# Patient Record
Sex: FEMALE | Race: OTHER | Employment: UNEMPLOYED | URBAN - METROPOLITAN AREA
[De-identification: names, ages, dates, MRNs, and addresses within clinical notes are randomized per-mention and may not be internally consistent; named-entity substitution may affect disease eponyms.]

---

## 2019-01-14 ENCOUNTER — OFFICE VISIT (OUTPATIENT)
Dept: FAMILY MEDICINE CLINIC | Facility: CLINIC | Age: 19
End: 2019-01-14
Payer: MEDICAID

## 2019-01-14 VITALS
SYSTOLIC BLOOD PRESSURE: 114 MMHG | DIASTOLIC BLOOD PRESSURE: 73 MMHG | HEIGHT: 61.6 IN | BODY MASS INDEX: 27.21 KG/M2 | HEART RATE: 85 BPM | RESPIRATION RATE: 20 BRPM | TEMPERATURE: 98 F | WEIGHT: 146 LBS

## 2019-01-14 DIAGNOSIS — R10.13 DYSPEPSIA: ICD-10-CM

## 2019-01-14 DIAGNOSIS — L50.9 URTICARIA: ICD-10-CM

## 2019-01-14 PROCEDURE — 99213 OFFICE O/P EST LOW 20 MIN: CPT | Performed by: FAMILY MEDICINE

## 2019-01-14 PROCEDURE — 99212 OFFICE O/P EST SF 10 MIN: CPT | Performed by: FAMILY MEDICINE

## 2019-01-14 NOTE — PROGRESS NOTES
HPI:    Patient ID: Elizabeth Dent is a 22year old female. Pt presents with hives on the abdomen, chest for the last week. No new topical agents or ingestions. Pt has tried otc remedies with benadryl and hives have resolved. No fevers or acute illness.

## 2019-03-05 PROCEDURE — 82784 ASSAY IGA/IGD/IGG/IGM EACH: CPT | Performed by: INTERNAL MEDICINE

## 2019-03-09 ENCOUNTER — LAB ENCOUNTER (OUTPATIENT)
Dept: LAB | Age: 19
End: 2019-03-09
Attending: INTERNAL MEDICINE
Payer: MEDICAID

## 2019-03-09 DIAGNOSIS — R19.7 DIARRHEA, UNSPECIFIED TYPE: ICD-10-CM

## 2019-03-09 LAB
CRYPTOSP AG STL QL IA: NEGATIVE
G LAMBLIA AG STL QL IA: NEGATIVE

## 2019-03-09 PROCEDURE — 87177 OVA AND PARASITES SMEARS: CPT

## 2019-03-09 PROCEDURE — 87272 CRYPTOSPORIDIUM AG IF: CPT

## 2019-03-09 PROCEDURE — 83993 ASSAY FOR CALPROTECTIN FECAL: CPT

## 2019-03-09 PROCEDURE — 87329 GIARDIA AG IA: CPT

## 2019-03-09 PROCEDURE — 87209 SMEAR COMPLEX STAIN: CPT

## 2019-03-12 LAB — CALPROTECTIN, FECAL: <16 UG/G

## 2019-03-18 LAB — OVA AND PARASITE, TRICHROME STAIN: NEGATIVE

## 2020-01-23 ENCOUNTER — OFFICE VISIT (OUTPATIENT)
Dept: FAMILY MEDICINE CLINIC | Facility: CLINIC | Age: 20
End: 2020-01-23
Payer: MEDICAID

## 2020-01-23 VITALS
HEIGHT: 61 IN | RESPIRATION RATE: 20 BRPM | WEIGHT: 148 LBS | SYSTOLIC BLOOD PRESSURE: 117 MMHG | DIASTOLIC BLOOD PRESSURE: 79 MMHG | TEMPERATURE: 98 F | BODY MASS INDEX: 27.94 KG/M2 | HEART RATE: 106 BPM

## 2020-01-23 DIAGNOSIS — M25.562 ACUTE PAIN OF LEFT KNEE: Primary | ICD-10-CM

## 2020-01-23 PROCEDURE — 99213 OFFICE O/P EST LOW 20 MIN: CPT | Performed by: FAMILY MEDICINE

## 2020-01-23 RX ORDER — BUPROPION HYDROCHLORIDE 75 MG/1
TABLET ORAL
COMMUNITY
Start: 2019-11-01

## 2020-01-23 RX ORDER — QUETIAPINE 50 MG/1
TABLET, FILM COATED ORAL
COMMUNITY
Start: 2019-07-05

## 2020-01-23 NOTE — PROGRESS NOTES
HPI:    Patient ID: Consuelo Samuel is a 20year old female. Pt presents with left knee pain over the last week. Pt has had some issues with his knee at times. Pt has sensitivity of the knee cap area. No swelling. No injury or trauma.  Pt feels pressure o

## 2020-01-25 ENCOUNTER — HOSPITAL ENCOUNTER (OUTPATIENT)
Dept: GENERAL RADIOLOGY | Age: 20
Discharge: HOME | End: 2020-01-25
Attending: FAMILY MEDICINE
Payer: MEDICAID

## 2020-01-25 DIAGNOSIS — M25.562 ACUTE PAIN OF LEFT KNEE: ICD-10-CM

## 2020-01-25 PROCEDURE — 73562 X-RAY EXAM OF KNEE 3: CPT | Performed by: FAMILY MEDICINE

## 2020-03-24 ENCOUNTER — OFFICE VISIT (OUTPATIENT)
Dept: FAMILY MEDICINE CLINIC | Facility: CLINIC | Age: 20
End: 2020-03-24
Payer: MEDICAID

## 2020-03-24 VITALS
WEIGHT: 142 LBS | HEIGHT: 61 IN | DIASTOLIC BLOOD PRESSURE: 81 MMHG | BODY MASS INDEX: 26.81 KG/M2 | RESPIRATION RATE: 20 BRPM | TEMPERATURE: 98 F | SYSTOLIC BLOOD PRESSURE: 114 MMHG | HEART RATE: 94 BPM

## 2020-03-24 DIAGNOSIS — M25.562 ACUTE PAIN OF LEFT KNEE: ICD-10-CM

## 2020-03-24 PROCEDURE — 99213 OFFICE O/P EST LOW 20 MIN: CPT | Performed by: FAMILY MEDICINE

## 2020-03-24 NOTE — PROGRESS NOTES
HPI:    Patient ID: Leigh Ann Camilo is a 23year old female. Pt presents for follow up for her left knee pain. Pt states pain occurs when she is carrying weight on her back. Pt states no pain. No feeling of giving out. Pt denies any sig swelling.    Pt has

## 2020-06-16 ENCOUNTER — OFFICE VISIT (OUTPATIENT)
Dept: ORTHOPEDICS CLINIC | Facility: CLINIC | Age: 20
End: 2020-06-16
Payer: MEDICAID

## 2020-06-16 DIAGNOSIS — M76.892 TENDINITIS OF LEFT QUADRICEPS TENDON: Primary | ICD-10-CM

## 2020-06-16 PROCEDURE — 99243 OFF/OP CNSLTJ NEW/EST LOW 30: CPT | Performed by: ORTHOPAEDIC SURGERY

## 2020-06-16 NOTE — H&P
NURSING INTAKE COMMENTS: Patient presents with:  Knee Pain: left- pt states pain started november 2019. pt denies any injury. pt had XR in 01/2020      HPI: This 23year old female presents today with complaints of left knee pain for about 5 months.   Insid neck: Atraumatic, normocephalic, PERRLA  Respiratory: Regular, unlabored breathing  GI: Abdomen soft and nondistended  Vascular: 2+ and symmetric pulses  Skin: intact and benign  Neurologic: Bilateral motor strength symmetric and intact grossly, sensation

## 2020-06-25 ENCOUNTER — OFFICE VISIT (OUTPATIENT)
Dept: PHYSICAL THERAPY | Age: 20
End: 2020-06-25
Attending: ORTHOPAEDIC SURGERY
Payer: MEDICAID

## 2020-06-25 PROCEDURE — 97161 PT EVAL LOW COMPLEX 20 MIN: CPT

## 2020-06-25 PROCEDURE — 97110 THERAPEUTIC EXERCISES: CPT

## 2020-06-25 NOTE — PROGRESS NOTES
LOWER EXTREMITY EVALUATION:   Referring Physician: Dr. Castro Diaz  Date of Onset: Jan 2020 Date of Service: 6/25/2020   Diagnosis: Tendinitis of left quadriceps tendon (O33.568)  PATIENT SUMMARY:   Lydia Ruggiero is a 23year old y/o female who presents to the pain. Skilled Physical Therapy is medically necessary to address the above impairments and reach functional goals.      In agreement with FOTO score and clinical rationale, this evaluation involved Low Complexity decision making due to 1-2 personal factors/ include: Gait training, Manual Therapy, Neuromuscular Re-education, Therapeutic Activities, Therapeutic Exercise and Home Exercise Program instruction    Education or treatment limitation: None  Rehab Potential: excellent    FOTO: not given due to Covid-19

## 2020-06-30 ENCOUNTER — OFFICE VISIT (OUTPATIENT)
Dept: PHYSICAL THERAPY | Age: 20
End: 2020-06-30
Attending: ORTHOPAEDIC SURGERY
Payer: MEDICAID

## 2020-06-30 PROCEDURE — 97110 THERAPEUTIC EXERCISES: CPT

## 2020-07-02 ENCOUNTER — OFFICE VISIT (OUTPATIENT)
Dept: PHYSICAL THERAPY | Age: 20
End: 2020-07-02
Attending: ORTHOPAEDIC SURGERY
Payer: MEDICAID

## 2020-07-02 PROCEDURE — 97110 THERAPEUTIC EXERCISES: CPT

## 2020-07-02 NOTE — PROGRESS NOTES
Dx:    Tendinitis of left quadriceps tendon (I49.066)      Insurance (Authorized # of Visits):  6 800 [employer] exp 12/22/20   POC visits: 6   Authorizing Physician: Dr. Castro Walker MD visit: none scheduled  Fall Risk: standard         Precautions:          Subjec

## 2020-07-07 ENCOUNTER — OFFICE VISIT (OUTPATIENT)
Dept: PHYSICAL THERAPY | Age: 20
End: 2020-07-07
Attending: ORTHOPAEDIC SURGERY
Payer: MEDICAID

## 2020-07-07 PROCEDURE — 97110 THERAPEUTIC EXERCISES: CPT

## 2020-07-07 NOTE — PROGRESS NOTES
Dx:    Tendinitis of left quadriceps tendon (A85.433)      Insurance (Authorized # of Visits):  6 800 [employer] exp 12/22/20   POC visits: 6   Authorizing Physician: Dr. Matthew Walker MD visit: none scheduled  Fall Risk: standard         Precautions:          Subjec yellow TB 15x B  Shuttle 6B 30x DL  Shuttle 5B 30x SL                              HEP:6/30/20- increase number of sets of HEP provided at eval    Charges: TEx3     Total Timed Treatment: 40 min  Total Treatment Time: 40 min

## 2020-07-09 ENCOUNTER — OFFICE VISIT (OUTPATIENT)
Dept: PHYSICAL THERAPY | Age: 20
End: 2020-07-09
Attending: ORTHOPAEDIC SURGERY
Payer: MEDICAID

## 2020-07-09 PROCEDURE — 97110 THERAPEUTIC EXERCISES: CPT

## 2020-07-09 NOTE — PROGRESS NOTES
Dx:    Tendinitis of left quadriceps tendon (D45.009)      Insurance (Authorized # of Visits):  6 800 [employer] exp 12/22/20   POC visits: 6   Authorizing Physician: Dr. Vladimir Walker MD visit: none scheduled  Fall Risk: standard         Precautions:          Subjec green TB 15x2  Supine hip abd yellow TB 15x B  Shuttle 6B 30x DL  Shuttle 5B 30x SL     There ex:  scifit L 4 x 6 min  Re-assessment  sidelying hip abd 15x2 B  Supine quad set w/towel roll 5 secx15  SLR 5 secx15 B  LAQ 5 secx15 B  Standing hip abd/ext flex

## 2020-07-14 ENCOUNTER — OFFICE VISIT (OUTPATIENT)
Dept: PHYSICAL THERAPY | Age: 20
End: 2020-07-14
Attending: ORTHOPAEDIC SURGERY
Payer: MEDICAID

## 2020-07-14 PROCEDURE — 97110 THERAPEUTIC EXERCISES: CPT

## 2020-07-14 NOTE — PROGRESS NOTES
Dx:    Tendinitis of left quadriceps tendon (Q67.712)      Insurance (Authorized # of Visits):  6 800 [email or web address] exp 12/22/20   POC visits: 6   Authorizing Physician: Dr. Renetta Rousseau  Next MD visit: none scheduled  Fall Risk: standard         Precautions:          Subjec min  Shuttle 6B 30x DL  Shuttle 5B 30x SL ea  Standing hip abd/ext yellow TB 15xea B  Standing hip drive 54GW  Bridge w/hip abd green TB 15x2  hooklying TA bracing 5 secx15  hooklying TA w/ball squeeze 5 secx15  SLR w/TA 15x2 B There ex:  scifit L3 x 6 min

## 2020-07-16 ENCOUNTER — APPOINTMENT (OUTPATIENT)
Dept: PHYSICAL THERAPY | Age: 20
End: 2020-07-16
Attending: ORTHOPAEDIC SURGERY
Payer: MEDICAID

## 2020-07-21 ENCOUNTER — APPOINTMENT (OUTPATIENT)
Dept: PHYSICAL THERAPY | Age: 20
End: 2020-07-21
Attending: ORTHOPAEDIC SURGERY
Payer: MEDICAID

## 2020-07-21 NOTE — PROGRESS NOTES
ProgressSummary  Pt has attended 6 visits in Physical Therapy.      Dx:    Tendinitis of left quadriceps tendon (F61.890)      Insurance (Authorized # of Visits):  6 800 [employer] exp 12/22/20   POC visits: 6   Authorizing Physician: Dr. Suzanna Walker MD visit: non for these services furnished under this plan of treatment and while under my care.     X___________________________________________________ Date____________________    Certification From: 0/74/9805  To:10/19/2020

## 2020-07-23 ENCOUNTER — OFFICE VISIT (OUTPATIENT)
Dept: PHYSICAL THERAPY | Age: 20
End: 2020-07-23
Attending: ORTHOPAEDIC SURGERY
Payer: MEDICAID

## 2020-07-23 PROCEDURE — 97110 THERAPEUTIC EXERCISES: CPT

## 2020-07-23 NOTE — PROGRESS NOTES
Dx:    Tendinitis of left quadriceps tendon (U91.020)      Insurance (Authorized # of Visits):  6 800 [employer] exp 12/22/20   POC visits: 6   Authorizing Physician: Dr. Gibran Brar  Next MD visit: none scheduled  Fall Risk: standard         Precautions:          Subjec knee bend with 1 lbs x 20  SL : hip abduction with 1 lbs x 20  SL : glut med with RTB x 20  Long sit : quad sets with RTB x 20  Sideways steps with RTB x 2 rounds        There ex:    There ex:  scifit L3 x 6 min  Standing hip abd/ext yellow TB 15x2 ea B  St

## 2020-07-28 ENCOUNTER — OFFICE VISIT (OUTPATIENT)
Dept: PHYSICAL THERAPY | Age: 20
End: 2020-07-28
Attending: ORTHOPAEDIC SURGERY
Payer: MEDICAID

## 2020-07-28 PROCEDURE — 97110 THERAPEUTIC EXERCISES: CPT

## 2020-07-28 NOTE — PROGRESS NOTES
Dx:    Tendinitis of left quadriceps tendon (B80.261)      Insurance (Authorized # of Visits):  12 800 [employer] exp 12/22/20   POC visits: 6   Authorizing Physician: Dr. Lexis Walker MD visit: none scheduled  Fall Risk: standard         Precautions:          Anastacio Degree 2   Prone :   Knee flexion stretch with belt assist x 5 with 20 sec hold   Prone : hip extension with 1 lbs x 20  Prone : hip extension with knee bend with 1 lbs x 20  SL : hip abduction with 1 lbs x 20  SL : glut med with RTB x 20  Long sit : quad sets wi

## 2020-07-30 ENCOUNTER — OFFICE VISIT (OUTPATIENT)
Dept: PHYSICAL THERAPY | Age: 20
End: 2020-07-30
Attending: ORTHOPAEDIC SURGERY
Payer: MEDICAID

## 2020-07-30 PROCEDURE — 97110 THERAPEUTIC EXERCISES: CPT

## 2020-07-30 NOTE — PROGRESS NOTES
Dx:    Tendinitis of left quadriceps tendon (L93.767)      Insurance (Authorized # of Visits):  14 800 [employer] exp 1/17/21   POC visits:    Authorizing Physician: Dr. Gibran Walker MD visit: none scheduled  Fall Risk: standard         Precautions:          Subject LAQ         7/14/20 added RTB for all hip ex's         7/28/20 added step up .  Lateral step up and step down on 4 inch step         7/30/20- sidestepping red TB    Charges: TEx3     Total Timed Treatment: 40 min  Total Treatment Time: 40 min

## 2020-08-04 ENCOUNTER — OFFICE VISIT (OUTPATIENT)
Dept: PHYSICAL THERAPY | Age: 20
End: 2020-08-04
Attending: ORTHOPAEDIC SURGERY
Payer: MEDICAID

## 2020-08-04 PROCEDURE — 97110 THERAPEUTIC EXERCISES: CPT

## 2020-08-06 ENCOUNTER — OFFICE VISIT (OUTPATIENT)
Dept: PHYSICAL THERAPY | Age: 20
End: 2020-08-06
Attending: ORTHOPAEDIC SURGERY
Payer: MEDICAID

## 2020-08-06 PROCEDURE — 97110 THERAPEUTIC EXERCISES: CPT

## 2020-08-06 NOTE — PROGRESS NOTES
Dx:    Tendinitis of left quadriceps tendon (Q41.131)      Insurance (Authorized # of Visits):  14 800 [employer] exp 1/17/21   POC visits:    Authorizing Physician: Dr. Cade Walker MD visit: none scheduled  Fall Risk: standard         Precautions:          Subject ex:  scifit bike L4 x 6 min  SB wall squats 15x2  SL squat w/hip abd isometric SB on wall 15xB  SL hip abd/ext/flex on airex 15xea B  Reverse lunge 15x B  SL dead lift 15x B  Sidestepping red tube 15x         There ex:  scifit bike L4 x 6 min  SB wall squa

## 2020-08-11 ENCOUNTER — APPOINTMENT (OUTPATIENT)
Dept: PHYSICAL THERAPY | Age: 20
End: 2020-08-11
Attending: ORTHOPAEDIC SURGERY
Payer: MEDICAID

## 2020-08-11 ENCOUNTER — TELEPHONE (OUTPATIENT)
Dept: PHYSICAL THERAPY | Age: 20
End: 2020-08-11

## 2020-08-13 ENCOUNTER — OFFICE VISIT (OUTPATIENT)
Dept: PHYSICAL THERAPY | Age: 20
End: 2020-08-13
Attending: ORTHOPAEDIC SURGERY
Payer: MEDICAID

## 2020-08-13 PROCEDURE — 97110 THERAPEUTIC EXERCISES: CPT

## 2020-08-13 NOTE — PROGRESS NOTES
Dx:    Tendinitis of left quadriceps tendon (Y93.668)      Insurance (Authorized # of Visits):  14 800 [employer] exp 1/17/21   POC visits:    Authorizing Physician: Dr. Babita Walker MD visit: none scheduled  Fall Risk: standard         Precautions:          Subject hip abduction with 1 lbs x 20  SL : glut med with RTB x 20  Long sit : quad sets with RTB x 20  Sideways steps with RTB x 2 rounds        There ex:  scifit bike L4 x 6 min  SB wall squats 15x2  SL squat w/hip abd isometric SB on wall 15xB  SL hip abd/ext/f

## 2020-08-18 ENCOUNTER — OFFICE VISIT (OUTPATIENT)
Dept: PHYSICAL THERAPY | Age: 20
End: 2020-08-18
Attending: ORTHOPAEDIC SURGERY
Payer: MEDICAID

## 2020-08-18 PROCEDURE — 97110 THERAPEUTIC EXERCISES: CPT

## 2020-08-18 NOTE — PROGRESS NOTES
Dx:    Tendinitis of left quadriceps tendon (E68.316)      Insurance (Authorized # of Visits):  14 800 [employer] exp 1/17/21   POC visits:    Authorizing Physician: Dr. Tadeo Walker MD visit: none scheduled  Fall Risk: standard         Precautions:          Subject

## 2020-08-20 ENCOUNTER — OFFICE VISIT (OUTPATIENT)
Dept: PHYSICAL THERAPY | Age: 20
End: 2020-08-20
Attending: ORTHOPAEDIC SURGERY
Payer: MEDICAID

## 2020-08-20 PROCEDURE — 97110 THERAPEUTIC EXERCISES: CPT

## 2020-08-20 NOTE — PROGRESS NOTES
Dx:    Tendinitis of left quadriceps tendon (Z65.684)      Insurance (Authorized # of Visits):  14 107 [employer] exp 1/17/21   POC visits:    Authorizing Physician: Dr. Herve Reddy  Next MD visit: none scheduled  Fall Risk: standard         Precautions:          Subject with RTB x 2 rounds   Monster walk / retro monster walk with RTBx 2 rounds              HEP:6/30/20- increase number of sets of HEP provided at eval          7/9/20- standing hip abd/ext/flex yellow TB, quad sets, LAQ         7/14/20 added RTB for all hip

## 2020-08-22 ENCOUNTER — NURSE ONLY (OUTPATIENT)
Dept: FAMILY MEDICINE CLINIC | Facility: CLINIC | Age: 20
End: 2020-08-22
Payer: MEDICAID

## 2020-08-22 DIAGNOSIS — Z02.9 ENCOUNTERS FOR ADMINISTRATIVE PURPOSE: Primary | ICD-10-CM

## 2020-08-22 NOTE — PROGRESS NOTES
Patient requesting 2nd meningitis vaccine. Initially only one Menactra shows up under vaccines. After clicking Immunization registration link, 2nd Menactra is present. Printed out immunization record for patient. No questions at this time.

## 2022-02-19 ENCOUNTER — OFFICE VISIT (OUTPATIENT)
Dept: FAMILY MEDICINE CLINIC | Facility: CLINIC | Age: 22
End: 2022-02-19
Payer: MEDICAID

## 2022-02-19 VITALS
TEMPERATURE: 97 F | DIASTOLIC BLOOD PRESSURE: 78 MMHG | HEART RATE: 100 BPM | SYSTOLIC BLOOD PRESSURE: 115 MMHG | RESPIRATION RATE: 20 BRPM | WEIGHT: 143 LBS | HEIGHT: 61 IN | BODY MASS INDEX: 27 KG/M2

## 2022-02-19 DIAGNOSIS — L74.512 HYPERHIDROSIS OF PALMS AND SOLES: ICD-10-CM

## 2022-02-19 DIAGNOSIS — L74.513 HYPERHIDROSIS OF PALMS AND SOLES: ICD-10-CM

## 2022-02-19 PROCEDURE — 3008F BODY MASS INDEX DOCD: CPT | Performed by: FAMILY MEDICINE

## 2022-02-19 PROCEDURE — 3078F DIAST BP <80 MM HG: CPT | Performed by: FAMILY MEDICINE

## 2022-02-19 PROCEDURE — 99213 OFFICE O/P EST LOW 20 MIN: CPT | Performed by: FAMILY MEDICINE

## 2022-02-19 PROCEDURE — 3074F SYST BP LT 130 MM HG: CPT | Performed by: FAMILY MEDICINE

## 2022-02-28 ENCOUNTER — OFFICE VISIT (OUTPATIENT)
Dept: DERMATOLOGY CLINIC | Facility: CLINIC | Age: 22
End: 2022-02-28
Payer: MEDICAID

## 2022-02-28 DIAGNOSIS — R61 HYPERHIDROSIS: Primary | ICD-10-CM

## 2022-02-28 PROCEDURE — 99203 OFFICE O/P NEW LOW 30 MIN: CPT | Performed by: PHYSICIAN ASSISTANT

## 2022-11-09 NOTE — PROGRESS NOTES
Dx:    Tendinitis of left quadriceps tendon (I43.689)      Insurance (Authorized # of Visits):  6 800 [employer] exp 12/22/20   POC visits: 6   Authorizing Physician: Dr. Lexis Walker MD visit: none scheduled  Fall Risk: standard         Precautions:          Subjec 20

## 2024-01-29 ENCOUNTER — APPOINTMENT (OUTPATIENT)
Dept: URBAN - METROPOLITAN AREA CLINIC 244 | Age: 24
Setting detail: DERMATOLOGY
End: 2024-01-30

## 2024-01-29 DIAGNOSIS — L74.51 PRIMARY FOCAL HYPERHIDROSIS: ICD-10-CM

## 2024-01-29 PROBLEM — L74.512 PRIMARY FOCAL HYPERHIDROSIS, PALMS: Status: ACTIVE | Noted: 2024-01-29

## 2024-01-29 PROCEDURE — OTHER OTC TREATMENT REGIMEN: OTHER

## 2024-01-29 PROCEDURE — OTHER ADDITIONAL NOTES: OTHER

## 2024-01-29 PROCEDURE — OTHER PRESCRIPTION: OTHER

## 2024-01-29 PROCEDURE — OTHER COUNSELING: OTHER

## 2024-01-29 PROCEDURE — 99204 OFFICE O/P NEW MOD 45 MIN: CPT

## 2024-01-29 RX ORDER — GLYCOPYRROLATE 1 MG/1
TABLET ORAL
Qty: 60 | Refills: 0 | Status: ERX | COMMUNITY
Start: 2024-01-29

## 2024-01-29 ASSESSMENT — LOCATION SIMPLE DESCRIPTION DERM
LOCATION SIMPLE: LEFT HAND
LOCATION SIMPLE: RIGHT HAND

## 2024-01-29 ASSESSMENT — LOCATION ZONE DERM: LOCATION ZONE: HAND

## 2024-01-29 ASSESSMENT — LOCATION DETAILED DESCRIPTION DERM
LOCATION DETAILED: LEFT RADIAL PALM
LOCATION DETAILED: RIGHT RADIAL PALM

## 2024-01-29 NOTE — PROCEDURE: ADDITIONAL NOTES
Render Risk Assessment In Note?: no
Detail Level: Simple
Additional Notes: Discussed taking oral glycopyrrolate and side effects.

## 2024-01-29 NOTE — PROCEDURE: OTC TREATMENT REGIMEN
Patient Specific Otc Recommendations (Will Not Stick From Patient To Patient): Iontonicphoresis machine
Detail Level: Zone

## 2024-01-30 ENCOUNTER — MED REC SCAN ONLY (OUTPATIENT)
Dept: FAMILY MEDICINE CLINIC | Facility: CLINIC | Age: 24
End: 2024-01-30

## 2024-02-19 ENCOUNTER — APPOINTMENT (OUTPATIENT)
Dept: URBAN - METROPOLITAN AREA CLINIC 244 | Age: 24
Setting detail: DERMATOLOGY
End: 2024-02-20

## 2024-02-19 DIAGNOSIS — L74.51 PRIMARY FOCAL HYPERHIDROSIS: ICD-10-CM

## 2024-02-19 PROBLEM — L74.512 PRIMARY FOCAL HYPERHIDROSIS, PALMS: Status: ACTIVE | Noted: 2024-02-19

## 2024-02-19 PROCEDURE — OTHER COUNSELING: OTHER

## 2024-02-19 PROCEDURE — OTHER PRESCRIPTION: OTHER

## 2024-02-19 PROCEDURE — OTHER OTC TREATMENT REGIMEN: OTHER

## 2024-02-19 PROCEDURE — 99214 OFFICE O/P EST MOD 30 MIN: CPT

## 2024-02-19 PROCEDURE — OTHER ADDITIONAL NOTES: OTHER

## 2024-02-19 RX ORDER — GLYCOPYRROLATE 1 MG/1
TABLET ORAL
Qty: 120 | Refills: 0 | Status: ERX

## 2024-02-19 ASSESSMENT — LOCATION SIMPLE DESCRIPTION DERM
LOCATION SIMPLE: RIGHT HAND
LOCATION SIMPLE: LEFT HAND

## 2024-02-19 ASSESSMENT — LOCATION ZONE DERM: LOCATION ZONE: HAND

## 2024-02-19 ASSESSMENT — LOCATION DETAILED DESCRIPTION DERM
LOCATION DETAILED: RIGHT RADIAL PALM
LOCATION DETAILED: LEFT RADIAL PALM

## 2024-02-19 NOTE — PROCEDURE: ADDITIONAL NOTES
Render Risk Assessment In Note?: no
Detail Level: Simple
Additional Notes: Discussed taking oral glycopyrrolate and side effects.\\n\\nPt would like to increase dose

## 2024-02-20 ENCOUNTER — MED REC SCAN ONLY (OUTPATIENT)
Dept: FAMILY MEDICINE CLINIC | Facility: CLINIC | Age: 24
End: 2024-02-20

## 2024-03-25 ENCOUNTER — APPOINTMENT (OUTPATIENT)
Dept: URBAN - METROPOLITAN AREA CLINIC 244 | Age: 24
Setting detail: DERMATOLOGY
End: 2024-03-25

## 2024-03-25 DIAGNOSIS — L74.51 PRIMARY FOCAL HYPERHIDROSIS: ICD-10-CM

## 2024-03-25 PROBLEM — L74.512 PRIMARY FOCAL HYPERHIDROSIS, PALMS: Status: ACTIVE | Noted: 2024-03-25

## 2024-03-25 PROCEDURE — 99214 OFFICE O/P EST MOD 30 MIN: CPT

## 2024-03-25 PROCEDURE — OTHER COUNSELING: OTHER

## 2024-03-25 PROCEDURE — OTHER PRESCRIPTION MEDICATION MANAGEMENT: OTHER

## 2024-03-25 PROCEDURE — OTHER OTC TREATMENT REGIMEN: OTHER

## 2024-03-25 PROCEDURE — OTHER PRESCRIPTION: OTHER

## 2024-03-25 RX ORDER — GLYCOPYRROLATE 1 MG/1
TABLET ORAL
Qty: 120 | Refills: 3 | Status: ERX

## 2024-03-25 RX ORDER — ALUMINUM CHLORIDE 20 %
SOLUTION, NON-ORAL TOPICAL QHS
Qty: 60 | Refills: 11 | Status: ERX | COMMUNITY
Start: 2024-03-25

## 2024-03-25 ASSESSMENT — LOCATION SIMPLE DESCRIPTION DERM
LOCATION SIMPLE: LEFT HAND
LOCATION SIMPLE: RIGHT HAND

## 2024-03-25 ASSESSMENT — LOCATION ZONE DERM: LOCATION ZONE: HAND

## 2024-03-25 ASSESSMENT — LOCATION DETAILED DESCRIPTION DERM
LOCATION DETAILED: RIGHT RADIAL PALM
LOCATION DETAILED: LEFT RADIAL PALM

## 2024-03-25 NOTE — PROCEDURE: OTC TREATMENT REGIMEN
Patient Specific Otc Recommendations (Will Not Stick From Patient To Patient): -Iontonicphoresis machine\\n-Carpe\\n-antiperspirant
Detail Level: Zone

## 2024-03-25 NOTE — PROCEDURE: PRESCRIPTION MEDICATION MANAGEMENT
Detail Level: Zone
Render In Strict Bullet Format?: No
Initiate Treatment: Drysol
Modify Regimen: Glycopyrrolate 4 tabs a day prn

## 2024-03-26 ENCOUNTER — MED REC SCAN ONLY (OUTPATIENT)
Dept: FAMILY MEDICINE CLINIC | Facility: CLINIC | Age: 24
End: 2024-03-26

## 2024-05-13 ENCOUNTER — MED REC SCAN ONLY (OUTPATIENT)
Dept: FAMILY MEDICINE CLINIC | Facility: CLINIC | Age: 24
End: 2024-05-13

## 2024-05-13 ENCOUNTER — APPOINTMENT (OUTPATIENT)
Dept: URBAN - METROPOLITAN AREA CLINIC 244 | Age: 24
Setting detail: DERMATOLOGY
End: 2024-05-13

## 2024-05-13 DIAGNOSIS — L74.51 PRIMARY FOCAL HYPERHIDROSIS: ICD-10-CM

## 2024-05-13 PROBLEM — L74.512 PRIMARY FOCAL HYPERHIDROSIS, PALMS: Status: ACTIVE | Noted: 2024-05-13

## 2024-05-13 PROCEDURE — OTHER COUNSELING: OTHER

## 2024-05-13 PROCEDURE — OTHER PRESCRIPTION: OTHER

## 2024-05-13 PROCEDURE — 99214 OFFICE O/P EST MOD 30 MIN: CPT

## 2024-05-13 PROCEDURE — OTHER ADDITIONAL NOTES: OTHER

## 2024-05-13 PROCEDURE — OTHER OTC TREATMENT REGIMEN: OTHER

## 2024-05-13 PROCEDURE — OTHER PRESCRIPTION MEDICATION MANAGEMENT: OTHER

## 2024-05-13 RX ORDER — ALUMINUM CHLORIDE 20 %
SOLUTION, NON-ORAL TOPICAL QHS
Qty: 60 | Refills: 11 | Status: ERX

## 2024-05-13 ASSESSMENT — LOCATION DETAILED DESCRIPTION DERM
LOCATION DETAILED: LEFT RADIAL PALM
LOCATION DETAILED: RIGHT RADIAL PALM

## 2024-05-13 ASSESSMENT — LOCATION SIMPLE DESCRIPTION DERM
LOCATION SIMPLE: LEFT HAND
LOCATION SIMPLE: RIGHT HAND

## 2024-05-13 ASSESSMENT — LOCATION ZONE DERM: LOCATION ZONE: HAND

## 2024-05-13 NOTE — PROCEDURE: PRESCRIPTION MEDICATION MANAGEMENT
Detail Level: Zone
Render In Strict Bullet Format?: No
Plan: STOP Glycopyrrolate 4 tabs a day prn\\nHave given pt samples of qbrexa wipes for 1 month, pt will RTC and if prefers qbrexa wipes we can send Rx out.
Modify Regimen: Drysol

## 2024-05-13 NOTE — PROCEDURE: ADDITIONAL NOTES
Additional Notes: Discussed Qbrexa wipes, given pt samples
Detail Level: Simple
Render Risk Assessment In Note?: no